# Patient Record
Sex: MALE | Race: WHITE | URBAN - METROPOLITAN AREA
[De-identification: names, ages, dates, MRNs, and addresses within clinical notes are randomized per-mention and may not be internally consistent; named-entity substitution may affect disease eponyms.]

---

## 2020-04-08 ENCOUNTER — OFFICE VISIT (OUTPATIENT)
Dept: VASCULAR SURGERY | Age: 75
End: 2020-04-08
Payer: MEDICARE

## 2020-04-08 VITALS
HEIGHT: 70 IN | HEART RATE: 90 BPM | DIASTOLIC BLOOD PRESSURE: 61 MMHG | BODY MASS INDEX: 24.34 KG/M2 | WEIGHT: 170 LBS | SYSTOLIC BLOOD PRESSURE: 84 MMHG

## 2020-04-08 PROCEDURE — 4040F PNEUMOC VAC/ADMIN/RCVD: CPT | Performed by: SURGERY

## 2020-04-08 PROCEDURE — 3017F COLORECTAL CA SCREEN DOC REV: CPT | Performed by: SURGERY

## 2020-04-08 PROCEDURE — 1036F TOBACCO NON-USER: CPT | Performed by: SURGERY

## 2020-04-08 PROCEDURE — G8420 CALC BMI NORM PARAMETERS: HCPCS | Performed by: SURGERY

## 2020-04-08 PROCEDURE — G8427 DOCREV CUR MEDS BY ELIG CLIN: HCPCS | Performed by: SURGERY

## 2020-04-08 PROCEDURE — 99204 OFFICE O/P NEW MOD 45 MIN: CPT | Performed by: SURGERY

## 2020-04-08 PROCEDURE — 1123F ACP DISCUSS/DSCN MKR DOCD: CPT | Performed by: SURGERY

## 2020-04-08 RX ORDER — TRAMADOL HYDROCHLORIDE 50 MG/1
TABLET ORAL
COMMUNITY
Start: 2020-04-06

## 2020-04-08 RX ORDER — LORATADINE 10 MG/1
TABLET ORAL
COMMUNITY
Start: 2012-12-18

## 2020-04-08 RX ORDER — ESZOPICLONE 3 MG/1
TABLET, FILM COATED ORAL
COMMUNITY
Start: 2020-04-07

## 2020-04-08 RX ORDER — FINASTERIDE 5 MG/1
TABLET, FILM COATED ORAL
COMMUNITY
Start: 2020-03-07

## 2020-04-08 RX ORDER — OMEPRAZOLE 20 MG/1
CAPSULE, DELAYED RELEASE ORAL
COMMUNITY
Start: 2020-04-06

## 2020-04-08 RX ORDER — ATORVASTATIN CALCIUM 40 MG/1
TABLET, FILM COATED ORAL
COMMUNITY
Start: 2020-04-06

## 2020-04-08 RX ORDER — FLUTICASONE PROPIONATE 50 MCG
SPRAY, SUSPENSION (ML) NASAL
COMMUNITY
Start: 2020-04-06

## 2020-04-08 RX ORDER — DIPHENHYDRAMINE HCL 25 MG
TABLET ORAL
COMMUNITY
Start: 2012-05-22

## 2020-04-08 RX ORDER — AZELASTINE 1 MG/ML
SPRAY, METERED NASAL
COMMUNITY
Start: 2020-03-07

## 2020-04-08 NOTE — PROGRESS NOTES
Subjective:      Patient ID: Saba Huber is a 69 y.o. male. HPI Referral from Kristina Saleem MD for evaluation for planned anterior and posterior L4L5 fusion for severe debilitating back and leg pain. Previous posterior fusion 20 years ago. S/P total abdominal colectomy with J pouch 20 years ago at [facility]. No other intraabdominal procedures since then. No vascular interventions. Past Medical History:   Diagnosis Date    CAD (coronary artery disease)     CKD (chronic kidney disease)     DDD (degenerative disc disease), lumbar     Hyperlipidemia     Hypotension     Melanoma in situ of cheek ([address]      Past Surgical History:   Procedure Laterality Date    APPENDECTOMY      CHOLECYSTECTOMY, LAPAROSCOPIC      PACEMAKER PLACEMENT      SPINE SURGERY      TOTAL COLECTOMY       Allergies   Allergen Reactions    Sulfa Antibiotics     Morphine Rash     Current Outpatient Medications   Medication Sig Dispense Refill    atorvastatin (LIPITOR) 40 MG tablet       azelastine (ASTELIN) 0.1 % nasal spray USE 1 SPRAY(S) IN EACH NOSTRIL TWICE DAILY AS DIRECTED      diphenhydrAMINE (BENADRYL ALLERGY) 25 MG tablet BENADRYL ALLERGY 25 MG TABS      eszopiclone (LUNESTA) 3 MG TABS       finasteride (PROSCAR) 5 MG tablet TAKE 1 TABLET BY MOUTH ONCE DAILY      fluticasone (FLONASE) 50 MCG/ACT nasal spray       Loperamide HCl (IMODIUM A-D PO) IMODIUM A-D TABS      loratadine (CLARITIN) 10 MG tablet CLARITIN 10 MG TABS      omeprazole (PRILOSEC) 20 MG delayed release capsule       traMADol (ULTRAM) 50 MG tablet       aspirin 81 MG tablet Take 81 mg by mouth daily       No current facility-administered medications for this visit.       Social History     Socioeconomic History    Marital status:      Spouse name: Not on file    Number of children: Not on file    Years of education: Not on file    Highest education level: Not on file   Occupational History    Not on file   Social Needs    Financial resource

## 2020-04-08 NOTE — LETTER
[address] [place] [address]  [facility] [address]  Phone: [phone]  Fax: [phone]    Calderon Parks MD        April 8, 2020       Patient: Elif Coronel   MR Number: [id number]   YOB: 1945   Date of Visit: 4/8/2020       Dear Prateek Oseguera:    I saw Mr. Sofy Alcala in the office prior to his planned anterior approach for lumbar fusion. While he has had a previous total abdominal colectomy with J-pouch for his Crohn's disease I think his surgery can be done with adequate exposure and safety. Look forward to helping in this case in the near future. If you have questions, please do not hesitate to call me. I look forward to following [facility] along with you.     Sincerely,        Hollis Gibson MD     providers:  Calderon Parks MD  [address] [place] [address] [place]

## 2020-06-02 PROCEDURE — 22558 ARTHRD ANT NTRBD MIN DSC LUM: CPT | Performed by: SURGERY

## 2020-06-04 ENCOUNTER — OUTSIDE SERVICES (OUTPATIENT)
Dept: VASCULAR SURGERY | Age: 75
End: 2020-06-04
Payer: MEDICARE